# Patient Record
Sex: MALE | Race: WHITE | NOT HISPANIC OR LATINO | URBAN - METROPOLITAN AREA
[De-identification: names, ages, dates, MRNs, and addresses within clinical notes are randomized per-mention and may not be internally consistent; named-entity substitution may affect disease eponyms.]

---

## 2023-12-04 ENCOUNTER — EMERGENCY (EMERGENCY)
Facility: HOSPITAL | Age: 38
LOS: 1 days | Discharge: HOME | End: 2023-12-04
Attending: EMERGENCY MEDICINE
Payer: SELF-PAY

## 2023-12-04 VITALS
OXYGEN SATURATION: 98 % | TEMPERATURE: 98 F | RESPIRATION RATE: 16 BRPM | SYSTOLIC BLOOD PRESSURE: 128 MMHG | HEART RATE: 96 BPM | WEIGHT: 179.9 LBS | DIASTOLIC BLOOD PRESSURE: 89 MMHG | HEIGHT: 67 IN

## 2023-12-04 VITALS
RESPIRATION RATE: 20 BRPM | DIASTOLIC BLOOD PRESSURE: 84 MMHG | SYSTOLIC BLOOD PRESSURE: 143 MMHG | TEMPERATURE: 98 F | OXYGEN SATURATION: 97 % | HEART RATE: 97 BPM

## 2023-12-04 PROCEDURE — T1013: CPT

## 2023-12-04 PROCEDURE — 99285 EMERGENCY DEPT VISIT HI MDM: CPT

## 2023-12-04 PROCEDURE — 99283 EMERGENCY DEPT VISIT LOW MDM: CPT

## 2023-12-04 NOTE — ED PROVIDER NOTE - PATIENT PORTAL LINK FT
You can access the FollowMyHealth Patient Portal offered by [facility] by registering at the following website: [email or web address]. By joining [employer]’s FollowMyHealth portal, you will also be able to view your health information using other applications (apps) compatible with our system. You can access the FollowMyHealth Patient Portal offered by [facility] by registering at the following website: [email or web address]. By joining [employer]’s FollowMyHealth portal, you will also be able to view your health information using other applications (apps) compatible with our system.

## 2023-12-04 NOTE — ED ADULT TRIAGE NOTE - CHIEF COMPLAINT QUOTE
found sleeping next to store with bottle of liquor. alcohol on breath. as per ems, unable to get up from ground and unsteady on feet. as per pt, "I am depressed and an alcoholic and I might need some help."

## 2023-12-04 NOTE — ED ADULT NURSE NOTE - OBJECTIVE STATEMENT
PT sleeping on stretcher. PT arouses with touch and voice stimuli. RR even and unlabored on RA. skin is warm and dry. No signs of injury. PT admits to drinking today but is not answering any other assessment questions at this time. Dressed in yellow gown and bed in lowest position.

## 2023-12-04 NOTE — ED ADULT NURSE NOTE - NSFALLRISKINTERV_ED_ALL_ED
Yes Assistance OOB with selected safe patient handling equipment if applicable/Assistance with ambulation/Communicate fall risk and risk factors to all staff, patient, and family/Monitor gait and stability/Monitor for mental status changes and reorient to person, place, and time, as needed/Provide visual cue: yellow wristband, yellow gown, etc/Reinforce activity limits and safety measures with patient and family/Toileting schedule using arm’s reach rule for commode and bathroom/Use of alarms - bed, stretcher, chair and/or video monitoring/Call bell, personal items and telephone in reach/Instruct patient to call for assistance before getting out of bed/chair/stretcher/Non-slip footwear applied when patient is off stretcher/[place] to call system/Physically safe environment - no spills, clutter or unnecessary equipment/Purposeful Proactive Rounding/Room/bathroom lighting operational, light cord in reach Assistance OOB with selected safe patient handling equipment if applicable/Assistance with ambulation/Communicate fall risk and risk factors to all staff, patient, and family/Monitor gait and stability/Monitor for mental status changes and reorient to person, place, and time, as needed/Provide visual cue: yellow wristband, yellow gown, etc/Reinforce activity limits and safety measures with patient and family/Toileting schedule using arm’s reach rule for commode and bathroom/Use of alarms - bed, stretcher, chair and/or video monitoring/Call bell, personal items and telephone in reach/Instruct patient to call for assistance before getting out of bed/chair/stretcher/Non-slip footwear applied when patient is off stretcher/[place] to call system/Physically safe environment - no spills, clutter or unnecessary equipment/Purposeful Proactive Rounding/Room/bathroom lighting operational, light cord in reach

## 2023-12-04 NOTE — ED PROVIDER NOTE - CLINICAL SUMMARY MEDICAL DECISION MAKING FREE TEXT BOX
The patient presents with mild alcohol intoxication and now sober without the sign of alcohol withdrawal and will dc home

## 2023-12-04 NOTE — ED PROVIDER NOTE - CONSTITUTIONAL, MLM
Well appearing, awake, alert, oriented to person, place, time/situation and in no apparent distress. Mild intoxication normal...

## 2024-03-30 ENCOUNTER — EMERGENCY (EMERGENCY)
Facility: HOSPITAL | Age: 39
LOS: 1 days | Discharge: HOME | End: 2024-03-30
Attending: STUDENT IN AN ORGANIZED HEALTH CARE EDUCATION/TRAINING PROGRAM
Payer: SELF-PAY

## 2024-03-30 VITALS
OXYGEN SATURATION: 97 % | HEART RATE: 118 BPM | WEIGHT: 169.98 LBS | HEIGHT: 64 IN | DIASTOLIC BLOOD PRESSURE: 66 MMHG | TEMPERATURE: 98 F | RESPIRATION RATE: 16 BRPM | SYSTOLIC BLOOD PRESSURE: 114 MMHG

## 2024-03-30 PROCEDURE — 99285 EMERGENCY DEPT VISIT HI MDM: CPT

## 2024-03-30 PROCEDURE — 99283 EMERGENCY DEPT VISIT LOW MDM: CPT

## 2024-03-30 PROCEDURE — 82962 GLUCOSE BLOOD TEST: CPT

## 2024-03-30 NOTE — ED PROVIDER NOTE - PHYSICAL EXAMINATION
Gen: NAD, non-toxic, conversational  Eyes: PERRLA, EOMI  HENT: Normocephalic, atraumatic. External ears normal, no rhinorrhea, moist mucous membranes  CV: RRR, no M/R/G  Resp: CTAB, non-labored, speaking without difficulty on room air  Abd: soft, non tender, non rigid, no guarding or rebound tenderness  Back: No CVAT bilaterally, no midline ttp  Skin: dry, wwp  Neuro: AOx3, speech is fluent and appropriate  Psych: Mood ok, affect euthymic

## 2024-03-30 NOTE — ED PROVIDER NOTE - PATIENT PORTAL LINK FT
You can access the FollowMyHealth Patient Portal offered by [facility] by registering at the following website: [email or web address]. By joining [employer]’s FollowMyHealth portal, you will also be able to view your health information using other applications (apps) compatible with our system.

## 2024-03-30 NOTE — ED ADULT TRIAGE NOTE - CHIEF COMPLAINT QUOTE
pt [place] [employer] [place]. no external signs of trauma noted. alcohol on breath. admits to etoh ingestion today. awakens to painful stimuli and able to state name and follow commands.

## 2024-03-30 NOTE — ED PROVIDER NOTE - CLINICAL SUMMARY MEDICAL DECISION MAKING FREE TEXT BOX
38M presenting for evaluation after being found outside in the cold, appears intoxicated on alcohol, able to move extremities, no obvious signs of trauma, changed into yellow gown, placed in bed to sleep. Will reassess for sobriety often.

## 2024-03-30 NOTE — ED PROVIDER NOTE - NSFOLLOWUPINSTRUCTIONS_ED_ALL_ED_FT
Alcohol Abuse    Alcohol intoxication occurs when the amount of alcohol that a person has consumed impairs his or her ability to mentally and physically function. Chronic alcohol consumption can also lead to a variety of health issues including neurological disease, stomach disease, heart disease, liver disease, etc. Do not drive after drinking alcohol. Drinking enough alcohol to end up in an Emergency Room suggests you may have an alcohol abuse problem. Seek help at a drug addiction center.    SEEK IMMEDIATE MEDICAL CARE IF YOU HAVE ANY OF THE FOLLOWING SYMPTOMS: seizures, vomiting blood, blood in your stool, lightheadedness/dizziness, or becoming shaky to tremulous when you stop drinking.    L'abus d'alcool    L'intoxication alcoolique se produit lorsque la quantité d'alcool qu'une personne a consommée altère sa capacité à fonctionner mentalement et physiquement. La consommation chronique d'alcool peut également entraîner divers problèmes de santé, notamment yann maladies neurologiques, yann maladies de l'estomac, yann maladies cardiaques, yann maladies du foie, etc. Ne conduisez pas après avoir bu de l'alcool. Boire suffisamment d’alcool pour se retrouver aux urgences suggère que vous pourriez avoir un problème d’abus d’alcool. Demandez de l'aide dans un centre de toxicomanie.    RECHERCHEZ YANN SOINS MÉDICAUX IMMÉDIATS SI VOUS PRÉSENTEZ L'UN YANN SYMPTÔMES SUIVANTS : convulsions, vomissements de sang, sang dans daphney selles, étourdissements/étourdissements ou tremblements lorsque vous arrêtez de boire.

## 2024-03-30 NOTE — ED PROVIDER NOTE - OBJECTIVE STATEMENT
38-year-old male presented via EMS after being found outside intoxicated.  Denies having any concerns currently, wants to go to sleep.

## 2024-03-30 NOTE — ED PROVIDER NOTE - NSFOLLOWUPCLINICS_GEN_ALL_ED_FT
[id number] [place] [address]  Phone: [phone]  Fax:   Follow Up Time: 4-6 Days

## 2024-03-31 VITALS
SYSTOLIC BLOOD PRESSURE: 108 MMHG | OXYGEN SATURATION: 99 % | RESPIRATION RATE: 17 BRPM | HEART RATE: 98 BPM | TEMPERATURE: 98 F | DIASTOLIC BLOOD PRESSURE: 62 MMHG

## 2024-03-31 NOTE — ED ADULT NURSE NOTE - OBJECTIVE STATEMENT
pt presenting to ED for alcohol intoxication. pt resting comfortably in bed, in no acute distress, resp even and unlabored. equal chest rise and fall noted. . safety maintained

## 2024-04-03 DIAGNOSIS — F10.129 ALCOHOL ABUSE WITH INTOXICATION, UNSPECIFIED: ICD-10-CM

## 2024-04-13 ENCOUNTER — EMERGENCY (EMERGENCY)
Facility: HOSPITAL | Age: 39
LOS: 1 days | Discharge: HOME | End: 2024-04-13
Attending: EMERGENCY MEDICINE
Payer: SELF-PAY

## 2024-04-13 VITALS
OXYGEN SATURATION: 98 % | RESPIRATION RATE: 18 BRPM | DIASTOLIC BLOOD PRESSURE: 81 MMHG | TEMPERATURE: 97 F | WEIGHT: 179.9 LBS | HEART RATE: 87 BPM | SYSTOLIC BLOOD PRESSURE: 119 MMHG
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
## 2024-04-13 DIAGNOSIS — S00.11XA CONTUSION OF RIGHT EYELID AND PERIOCULAR AREA, INITIAL ENCOUNTER: ICD-10-CM

## 2024-04-13 DIAGNOSIS — F10.129 ALCOHOL ABUSE WITH INTOXICATION, UNSPECIFIED: ICD-10-CM

## 2024-04-13 PROCEDURE — 99284 EMERGENCY DEPT VISIT MOD MDM: CPT

## 2024-04-13 NOTE — ED ADULT TRIAGE NOTE - CHIEF COMPLAINT QUOTE
BIBEMS after being found sleeping outside of a bar. Pt arouses to touch stimuli. no signs of trauma. changed into yellow gown.

## 2024-04-13 NOTE — ED PROVIDER NOTE - OBJECTIVE STATEMENT
Pt is an approximately 41 yo M found intoxicated outside of a bar.  Pt arousable to tactile stimuli. denies complaints.

## 2024-04-13 NOTE — ED PROVIDER NOTE - PROGRESS NOTE DETAILS
George: Pt received in signout from Dr. Ruelas. Pt in no distress, seems more awake and alert. However on my exam pt with ecchymosis to right upper periorbital area. Pt says he fell yesterday. Will check CTs. Libby Miner, DO: Patient improved, CT results reviewed, patient states he feels ready to go, steady on feet.

## 2024-04-13 NOTE — ED PROVIDER NOTE - PHYSICAL EXAMINATION
Constitutional - well-developed.   Head - NCAT. Airway patent.   Eyes - PERRL.   CV - RRR. no murmur. no edema.   Pulm - CTAB.   Abd - soft, nt. no rebound. no guarding.   Neuro -moving all extremities.  arousable to tactile stimuli  Skin - No rash. .  MSK - normal ROM.

## 2024-04-13 NOTE — ED PROVIDER NOTE - PATIENT PORTAL LINK FT
You can access the FollowMyHealth Patient Portal offered by [facility] by registering at the following website: [email or web address]. By joining [employer]’s FollowMyHealth portal, you will also be able to view your health information using other applications (apps) compatible with our system.

## 2024-04-13 NOTE — ED PROVIDER NOTE - CLINICAL SUMMARY MEDICAL DECISION MAKING FREE TEXT BOX
Pt ambulated to and from the bathroom. still unsteady on his feet and intoxicated. Pt signed out to dr. gale pending sobriety.

## 2024-04-14 VITALS
DIASTOLIC BLOOD PRESSURE: 75 MMHG | TEMPERATURE: 99 F | HEART RATE: 103 BPM | OXYGEN SATURATION: 96 % | SYSTOLIC BLOOD PRESSURE: 112 MMHG | RESPIRATION RATE: 18 BRPM

## 2024-04-14 PROCEDURE — 70486 CT MAXILLOFACIAL W/O DYE: CPT | Mod: MC

## 2024-04-14 PROCEDURE — 70450 CT HEAD/BRAIN W/O DYE: CPT | Mod: 26,MC

## 2024-04-14 PROCEDURE — 99285 EMERGENCY DEPT VISIT HI MDM: CPT | Mod: 25

## 2024-04-14 PROCEDURE — 82962 GLUCOSE BLOOD TEST: CPT

## 2024-04-14 PROCEDURE — 70450 CT HEAD/BRAIN W/O DYE: CPT | Mod: MC

## 2024-04-14 PROCEDURE — 70486 CT MAXILLOFACIAL W/O DYE: CPT | Mod: 26,MC

## 2024-04-14 PROCEDURE — T1013: CPT

## 2024-04-14 NOTE — ED ADULT NURSE REASSESSMENT NOTE - NS ED NURSE REASSESS COMMENT FT1
Received report from M.T and assumed care of pt. Pt is AxOx2 (does not know the where he is), easily awakens to verbal stimuli. Upon assessment pt has a black R eye. Pt is awaiting CT
plan of care assumed from Ashok RN @1920. no S&S of acute distress, pt resting comfortably on stretcher. pt reports drinking alochol today, pt is slurring words. does not offer acute complains. denies pain/n/v. pt in yellow gown. awaiting reassessment by provider.
rounds frequently provided for pt comfort and safety. no acute distress noted. pt expresses no other needs at this time. no further concerns as of present. plan of care ongoing.
rounds frequently provided for pt comfort and safety. no acute distress noted. pt expresses no other needs at this time. no further concerns as of present. plan of care ongoing.

## 2024-04-20 ENCOUNTER — EMERGENCY (EMERGENCY)
Facility: HOSPITAL | Age: 39
LOS: 1 days | Discharge: HOME | End: 2024-04-20
Attending: EMERGENCY MEDICINE
Payer: SELF-PAY

## 2024-04-20 VITALS
OXYGEN SATURATION: 100 % | DIASTOLIC BLOOD PRESSURE: 75 MMHG | RESPIRATION RATE: 18 BRPM | HEART RATE: 95 BPM | TEMPERATURE: 98 F | SYSTOLIC BLOOD PRESSURE: 121 MMHG

## 2024-04-20 VITALS
DIASTOLIC BLOOD PRESSURE: 99 MMHG | TEMPERATURE: 98 F | OXYGEN SATURATION: 97 % | WEIGHT: 179.9 LBS | RESPIRATION RATE: 20 BRPM | HEART RATE: 80 BPM | HEIGHT: 65 IN | SYSTOLIC BLOOD PRESSURE: 133 MMHG

## 2024-04-20 PROCEDURE — 70450 CT HEAD/BRAIN W/O DYE: CPT | Mod: MC

## 2024-04-20 PROCEDURE — 70450 CT HEAD/BRAIN W/O DYE: CPT | Mod: 26,MC

## 2024-04-20 PROCEDURE — 99285 EMERGENCY DEPT VISIT HI MDM: CPT | Mod: 25

## 2024-04-20 PROCEDURE — 72125 CT NECK SPINE W/O DYE: CPT | Mod: MC

## 2024-04-20 PROCEDURE — 72125 CT NECK SPINE W/O DYE: CPT | Mod: 26,MC

## 2024-04-20 PROCEDURE — 99284 EMERGENCY DEPT VISIT MOD MDM: CPT

## 2024-04-20 PROCEDURE — 82962 GLUCOSE BLOOD TEST: CPT

## 2024-04-20 NOTE — ED PROVIDER NOTE - PROGRESS NOTE DETAILS
Patient continues to be somnolent, not stable for discharge at this time Clinically sober ambulating no acute distress advised on alcohol cessation return to ED if worse

## 2024-04-20 NOTE — ED PROVIDER NOTE - ATTENDING CONTRIBUTION TO CARE
39-year-old  male with past medical history of alcohol use disorder presents intoxicated with right periorbital ecchymosis.  Patient brought straight to CT on arrival.  No acute traumatic injuries found on CT of head.  Patient signed out pending sobriety.

## 2024-04-20 NOTE — ED PROVIDER NOTE - PHYSICAL EXAMINATION
General: well appearing though slightly intoxicated appearing, NAD  Head: NC, right evolving periorbital ecchymosis  EENT: EOMI, no scleral icterus  Cardiac: RRR, no apparent murmurs, no lower extremity edema  Respiratory: CTABL, no respiratory distress   Abdomen: soft, ND, NT, nonperitonitic  MSK/Vascular: full ROM, soft compartments, warm extremities  Neuro: AAOx3, sensation to light touch intact  Psych: calm, cooperative

## 2024-04-20 NOTE — ED PROVIDER NOTE - PATIENT PORTAL LINK FT
You can access the FollowMyHealth Patient Portal offered by [facility] by registering at the following website: [email or web address]. By joining [employer]’s FollowMyHealth portal, you will also be able to view your health information using other applications (apps) compatible with our system.

## 2024-04-20 NOTE — ED ADULT NURSE REASSESSMENT NOTE - NS ED NURSE REASSESS COMMENT FT1
Report received from off-going RN at 19:30, VSS, pt resting comfortably in stretcher. Respirations even and unlabored. Patient safety maintained. Pt placed on cardiac monitor and . Awaiting HR bed. Report received from off-going RN at 19:30, VSS, pt resting comfortably in stretcher. Respirations even and unlabored. Patient safety maintained.

## 2024-04-20 NOTE — ED ADULT NURSE NOTE - NSFALLRISKINTERV_ED_ALL_ED

## 2024-04-20 NOTE — ED ADULT NURSE NOTE - OBJECTIVE STATEMENT
patient presents to ED intoxicated, with bruising to right orbital. patient endorses multiple falls in the past few days but offers no other medical complaints. Denies pain, cp, sob, n/v, abdominal pain. "I just want to sleep"  patient presents alert but intoxicated, calm and cooperative.

## 2024-04-20 NOTE — ED PROVIDER NOTE - CLINICAL SUMMARY MEDICAL DECISION MAKING FREE TEXT BOX
39-year-old male past medical history including EtOH abuse presenting for intoxication.  Pt returned to baseline. Ambulating with normal gait. Jb RAPP.

## 2024-04-20 NOTE — ED PROVIDER NOTE - NSFOLLOWUPINSTRUCTIONS_ED_ALL_ED_FT
Intoxicación alcohólica  Alcohol Intoxication  La intoxicación alcohólica ocurre cuando gissell persona ya no piensa con claridad ni se desempeña antonio después de consumir bebidas alcohólicas. Overly también se denomina deterioro. La intoxicación alcohólica puede ocurrir tan solo con gissell copa. El definición legal de la intoxicación alcohólica depende de la cantidad de alcohol en la pavel (concentración de alcohol en la pavel, LUCILA). Gissell LUCILA de 80 a 100 mg/dl o mayor se considera legalmente zo gissell intoxicación alcohólica. El nivel de deterioro depende de lo siguiente:  La cantidad de alcohol que la persona bebió.  La edad, el sexo y el peso de la persona.  La frecuencia con la que la persona julio cesar.  Si la persona tiene otras enfermedades, tales zo diabetes, convulsiones o gissell afección cardíaca.  La intoxicación alcohólica puede variar de leve a grave. La afección puede ser peligrosa, especialmente si la persona:  También usa ciertas drogas ilegales y medicamentos recetados.  Julio Cesar gissell gran cantidad de alcohol en un periodo corto de tiempo (consume alcohol compulsivamente).  En las mujeres, el consumo de alcohol compulsivo significa beber cuatro o más medidas de alcohol a la vez.  En los hombres, el consumo de alcohol compulsivo significa beber alex o más medidas de alcohol a la vez.  Si usted o alguien a fonseca alrededor parece estar intoxicado, diga algo y actúe.    ¿Cuáles son las causas?  La causa de esta afección es el consumo de alcohol.    ¿Qué incrementa el riesgo?  Los siguientes factores pueden hacer que sea más propenso a contraer esta afección:  Presión de los pares en los adultos jóvenes.  Dificultad para manejar el estrés.  Antecedentes de consumo excesivo de drogas o alcohol.  Antecedentes familiares de consumo excesivo de alcohol o drogas.  Combinación de alcohol con drogas.  Peso corporal bajo.  Consumo de alcohol compulsivo.  ¿Cuáles son los signos o síntomas?  Los síntomas de la intoxicación alcohólica pueden variar de gissell persona a otra. Los síntomas pueden ser leves, moderados o graves.    Los síntomas de gissell intoxicación alcohólica leve pueden incluir los siguientes:  Sensación de relajación o somnolencia.  Tener gissell leve dificultad con la coordinación, el habla, la memoria o la atención.  Los síntomas de gissell intoxicación alcohólica moderada pueden incluir los siguientes:  Diony janine o tristeza intensa.  Tener dificultad con la coordinación, el habla, la memoria o la atención.  Los síntomas de gissell intoxicación alcohólica grave pueden incluir los siguientes:  Tener gissell seria dificultad con la coordinación, el habla, la memoria o la atención.  Desmayo.  Vómitos.  Confusión.  Respiración lenta.  Coma.  La intoxicación alcohólica puede cambiar rápidamente de leve a grave. Puede causar coma o la muerte, especialmente en las personas que no beben alcohol con frecuencia.    ¿Cómo se diagnostica?  El médico le preguntará la cantidad y el tipo de bebida alcohólica que bebió. La intoxicación también puede diagnosticarse en función de:  Los síntomas y los antecedentes médicos.  Un examen físico.  Un análisis de pavel que mide la LUCILA.  El olor a alcohol en el aliento.  ¿Cómo se trata?  El tratamiento para la intoxicación por alcohol puede incluir:  Ser controlado en un departamento de emergencias, hospital o centro de tratamiento hasta que la LUCILA disminuya y sea seguro para usted regresar a fonseca casa.  Líquidos intravenosos (i.v.) para prevenir o tratar la pérdida de líquido en el cuerpo (deshidratación).  Medicamentos para tratar las náuseas o los vómitos, o para eliminar el alcohol del cuerpo.  Asesoramiento sobre los peligros de consumir alcohol.  Tratamiento para el trastorno por el consumo de sustancias.  Oxigenoterapia o gissell máquina para respirar (respirador).  Beber alcohol fam mucho tiempo puede tener efectos a london plazo en el cerebro, el corazón y el aparato digestivo. Estos efectos pueden ser graves y pueden requerir tratamiento.    Siga estas instrucciones en fonseca casa:  A sign showing that a person should not drive.  Comida y bebida    A 12-ounce bottle of beer, a 5-ounce glass of wine, and a 1.5-ounce shot of hard liquor.  No zoey alcohol si:  Fonseca médico le indica no hacerlo.  Está embarazada, puede estar embarazada o está tratando de quedar embarazada.  No tiene la edad legal para beber, o es sadie de 21 años de edad en los [place]  Está tomando medicamentos que no se deben leyla con alcohol.  Tiene gissell afección médica y el alcohol la empeora.  Tiene que conducir o realizar actividades que requieren que esté alerta.  Tiene un trastorno por abuso de sustancias.  Pregúntele al médico si el alcohol es seguro para usted. Si el médico le permite beber alcohol, limite la cantidad que adriana a lo siguiente:  De 0 a 1 medida por día para las mujeres que no están embarazadas.  De 0 a 2 medidas por día para los hombres.  Sepa cuánta cantidad de alcohol hay en las bebidas que adriana. En los [place], gissell medida equivale a gissell botella de cerveza de 12 oz (355 ml), un vaso de vino de 5 oz (148 ml) o un vaso de gissell bebida alcohólica de dilip graduación de 1½ oz (44 ml).  Evite beber alcohol con el estómago vacío.  El alcohol aumenta la micción. Es importante mantenerse hidratado y evitar la cafeína.  Evite consumir más de gissell bebida alcohólica por hora.  Cuando zoey más que gissell medida de alcohol, zoey agua o gissell bebida sin alcohol entre las bebidas alcohólicas.  Instrucciones generales    Use los medicamentos de venta david y los recetados solamente zo se lo haya indicado el médico.  No conduzca después de beber cualquier cantidad de alcohol. Organice un conductor designado u otra forma de volver a casa.  Pídale a alguna persona responsable que se quede con usted mientras está embriagado. Nodebería quedarse solo.  Comuníquese con un médico si:  No mejora luego de algunos días.  Tiene problemas en el trabajo, la escuela o el hogar debido al consumo de alcohol.  Solicite ayuda de inmediato si:  Tiene alguno de los siguientes síntomas:  Dificultad para mantenerse despierto.  Grave dificultad con la coordinación, el habla, la memoria o la atención.  Le troncoso dicho que pudo bree tenido gissell convulsión.  Vómitos con pavel de color villanueva brillante o gissell sustancia parecida a la borra del café.  Tiene pavel en la materia fecal (heces). La pavel puede hacer que las heces tengan color villanueva brillante, color shine o aspecto alquitranado.  Estos síntomas pueden indicar gissell emergencia. Solicite ayuda de inmediato. Llame al 911.  No espere a mehran si los síntomas desaparecen.  No conduzca por bora propios medios hasta el hospital.  También solicite ayuda de inmediato si:  Tiene pensamientos acerca de lastimarse o lastimar a otras personas.  Siga alguno de estos pasos si siente que puede lastimarse o lastimar a otras personas, o tiene pensamientos de poner fin a fonseca edilson:  Llame al 911.  Llame a National Suicide Prevention Lifeline (Línea [employer] Nacional para la Prevención del Suicidio) al 1-559.669.3693 o al 988. Está disponible las 24 horas del día.  Envíe un mensaje de texto a la línea para casos de crisis al 601235.  Resumen  La intoxicación alcohólica ocurre cuando gissell persona ya no piensa con claridad ni se desempeña antonio después de consumir bebidas alcohólicas.  Pregúntele al médico si el alcohol es seguro para usted. Si el médico le permite beber alcohol, limite la cantidad que adriana.  Comuníquese con el médico si el consumo de alcohol le ha causado problemas en el trabajo, en la escuela o en fonseca casa.  Busque ayuda de inmediato si tiene pensamientos acerca de lastimarse a usted mismo o a otras personas.  Esta información no tiene zo fin reemplazar el consejo del médico. Asegúrese de hacerle al médico cualquier pregunta que tenga.

## 2024-04-20 NOTE — ED PROVIDER NOTE - OBJECTIVE STATEMENT
39-year-old male past medical history including EtOH abuse presenting for intoxication.  Patient found to have a right periorbital ecchymosis.  When asked when he fell, patient says "today, yesterday, every day".  Patient not cooperating with history.  Due to intoxication and potential head trauma, brought to CT scan for priority CT head and neck.

## 2024-04-20 NOTE — ED ADULT NURSE REASSESSMENT NOTE - NS ED NURSE REASSESS COMMENT FT1
patient more alert and reoriented to situation, breathing even and unlabored. patient offers no medical complaints but reports "feeling sleepy."   patient in pleasant mood, grateful, calm and cooperative.

## 2024-04-20 NOTE — ED ADULT TRIAGE NOTE - CHIEF COMPLAINT QUOTE
pt found in the streets intoxicated, pt states he fell and hit his head, pt admits to drinking today. GSC 15 AOX4

## 2024-04-27 DIAGNOSIS — F10.129 ALCOHOL ABUSE WITH INTOXICATION, UNSPECIFIED: ICD-10-CM
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
## 2024-04-27 DIAGNOSIS — S05.11XA CONTUSION OF EYEBALL AND ORBITAL TISSUES, RIGHT EYE, INITIAL ENCOUNTER: ICD-10-CM
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
## 2024-04-30 ENCOUNTER — EMERGENCY (EMERGENCY)
Facility: HOSPITAL | Age: 39
LOS: 1 days | Discharge: HOME | End: 2024-04-30
Attending: EMERGENCY MEDICINE
Payer: SELF-PAY

## 2024-04-30 VITALS
TEMPERATURE: 98 F | HEART RATE: 96 BPM | RESPIRATION RATE: 16 BRPM | OXYGEN SATURATION: 95 % | DIASTOLIC BLOOD PRESSURE: 90 MMHG | SYSTOLIC BLOOD PRESSURE: 137 MMHG | HEIGHT: 64 IN

## 2024-04-30 PROCEDURE — 99283 EMERGENCY DEPT VISIT LOW MDM: CPT

## 2024-04-30 PROCEDURE — 99285 EMERGENCY DEPT VISIT HI MDM: CPT

## 2024-04-30 NOTE — ED PROVIDER NOTE - PHYSICAL EXAMINATION
· CONSTITUTIONAL: In no apparent distress.  · HEENMT: Airway patent, normal appearing mouth, nose, throat, neck supple with full range of motion, no cervical adenopathy.  · EYES: Pupils equal, round and reactive to light, Extra-ocular movement intact, eyes are clear b/l  · CARDIAC: Regular rate and rhythm, Heart sounds S1 S2 present, no murmurs, rubs or gallops  · RESPIRATORY: No respiratory distress. No stridor, Lungs sounds clear with good aeration bilaterally.  · GASTROINTESTINAL: Abdomen soft, nontender, non-distended, no rebound, no guarding and no masses. no hepatosplenomegaly.  · MUSCULOSKELETAL: Spine appears normal, movement of extremities grossly intact.  · NEUROLOGICAL: lerthargic and interactive, no focal deficits  · SKIN: No cyanosis, no pallor, no jaundice, no rash  · PSYCHIATRIC: Normal mood and affect, no apparent risk to self or others

## 2024-04-30 NOTE — ED ADULT NURSE NOTE - LANGUAGE ASSISTANCE NEEDED
Sunscreen Recommendations: Discussed sunblock should be applied to exposed areas daily, and be reapplied every two hours while exposed . The sunblock should be broad spectrum SPF-50, UVA/UVB and contain Zinc and Titanium Dioxide(Blue Lizard & Sun Bum are some good OTC brands). Strongly recommend Elta MD products. Recommend sun protective clothing such as long sleeve UPF protective shirts, wide brim hats, neck covers and sunglasses as it has been proven to prevent further photo damage from the sun. Detail Level: Zone Detail Level: Detailed Yes-Patient/Caregiver accepts free interpretation services...

## 2024-04-30 NOTE — ED PROVIDER NOTE - PATIENT PORTAL LINK FT
You can access the FollowMyHealth Patient Portal offered by [facility] by registering at the following website: [email or web address]. By joining [employer]’s FollowMyHealth portal, you will also be able to view your health information using other applications (apps) compatible with our system.

## 2024-04-30 NOTE — ED PROVIDER NOTE - OBJECTIVE STATEMENT
40 y/o M  presented via EMS after being found outside of [employer] intoxicated. States drank "a lot", would not elaborate further. With no other complaints currently.

## 2024-04-30 NOTE — ED PROVIDER NOTE - CLINICAL SUMMARY MEDICAL DECISION MAKING FREE TEXT BOX
40 y/o M present with acute alcohol intoxication. With no other complaints, Physical exam unremarkable.

## 2024-04-30 NOTE — ED ADULT NURSE NOTE - OBJECTIVE STATEMENT
40 y/o M  presented via EMS after being found outside of [employer] intoxicated. States drank "a lot", would not elaborate further. With no other complaints currently. Seen and evaluated by provider, no further orders at this time.  Patient resting comfortably on stretcher, yellow gown in place and all belongings removed and secured.  Offers no complaints at this time.

## 2024-04-30 NOTE — ED PROVIDER NOTE - ATTENDING CONTRIBUTION TO CARE
I performed a face to face bedside interview with patient regarding history of present illness, review of symptoms and past medical history. I completed an independent physical exam.  I have discussed patient's plan of care with resident.   I agree with note as stated above including HISTORY OF PRESENT ILLNESS, HIV, PAST MEDICAL/SURGICAL/FAMILY/SOCIAL HISTORY, ALLERGIES AND HOME MEDICATIONS, REVIEW OF SYSTEMS, PHYSICAL EXAM, MEDICAL DECISION MAKING and any PROGRESS NOTES during the time I functioned as the attending physician for this patient unless otherwise noted. My brief assessment is as follows:    General in no acute distress unkept respiratory clear cardiac no murmur abdomen soft neuro intact ambulating no acute distress no SI no HI advised on alcohol cessation refusing alcohol rehab clinically sober ambulating no acute distress return to ED if worse

## 2024-04-30 NOTE — ED PROVIDER NOTE - NSFOLLOWUPINSTRUCTIONS_ED_ALL_ED_FT
Paciente: MARJ REDD  Profesional que asiste al paciente: Ravi Wang  Consumo excesivo de alcohol y nutrición  Alcohol Abuse and Nutrition    El consumo excesivo de alcohol es cualquier patrón de consumo de alcohol que perjudique la kemi, las relaciones o el trabajo. El consumo excesivo de alcohol puede causar nutrición deficiente (malnutrición o desnutrición) y gissell falta de nutrientes (carencias nutritivas), lo que pepper vez ocasione otras complicaciones. El consumo excesivo de alcohol causa desnutrición y carencia nutritivas de dos formas:    Hace que el hígado funcione de forma anormal. Howe afecta la forma en la que el organismo divide (descompone) y absorbe los nutrientes de los alimentos.  Hace que la persona coma mal. Muchas personas que consumen alcohol en exceso no ingieren la cantidad suficiente de carbohidratos, proteínas, grasas, vitaminas y minerales.    Los nutrientes que suelen faltar (ser deficitarios) en las personas que consumen alcohol en exceso incluyen:    Vitaminas.    Vitamina A. Esta es importante para la visión, el metabolismo y la capacidad para combatir las infecciones (inmunidad).  Vitaminas B. Estas incluyen vitaminas tales zo el folato, la tiamina y la niacina. Estas son necesarias para el crecimiento de las nuevas células.  Vitamina C. Esta desempeña un papel importante en la cicatrización de las heridas, la inmunidad y ayuda al organismo a absorber el hafsa.  Vitamina D. Esta es necesaria para que el organismo absorba y use el calcio. Es producida por el hígado, lizzy puede obtenerse también de alimentos y de la exposición al sol.  Minerales.    Calcio. Nati es necesario para tener huesos saludables, así zo gissell función del corazón y los vasos sanguíneos (cardiovascular) saludable.  Hafsa. Es importante para la pavel, los músculos y el funcionamiento del sistema nervioso.  Magnesio. Desempeña un papel importante en el funcionamiento muscular y nervioso, y ayuda a controlar el nivel de azúcar en la pavel y la presión arterial.  Zinc. Nati es importante para el normal funcionamiento del sistema nervioso y el aparato digestivo (tubo digestivo).    Si mark que tiene un problema de dependencia del alcohol, o si le gavin dejar de beber porque se siente enfermo o diferente cuando no julio cesar alcohol, hable con fonseca médico u otro profesional de la kemi acerca de dónde obtener ayuda.    La nutrición es un factor fundamental del tratamiento para el consumo excesivo de alcohol. El médico o el especialista en dieta y nutrición (nutricionista) trabajarán con usted para diseñar un plan que pueda ayudar a que el organismo recupere los nutrientes y a evitar el riesgo de posibles complicaciones.    ¿En qué consiste el plan?     El nutricionista puede desarrollar un plan de alimentación específico en función de fonseca estado y de otros problemas que pueda tener. Un plan de alimentación incluirá por lo general:    Gissell dieta equilibrada.    Cereales: de 6 a 8 onzas (170 a 227 g) por día. Algunos ejemplos de 1 onza de cereales integrales incluyen 1 taza de cereales de gabby integral, ½ taza de arroz integral o 1 rodaja de pan de gabby integral.  Verduras: de 2 a 3 tazas por día. Algunos ejemplos de 1 taza de verduras incluyen 2 zanahorias medianas, 1 tomate asuncion o 2 tallos de apio.  Frutas: de 1 a 2 tazas por día. Algunos ejemplos de 1 taza de frutas incluyen 1 banana asuncion, 1 manzana pequeña, 8 fresas grandes o 1 naranja asuncion.  Carne y otras proteínas: de 5 a 6 onzas (142 a 170 g) por día.    Gissell porción de carne o pescado del tamaño de un von de cartas pesa alrededor de 3 a 4 onzas.  Los alimentos que proporcionan 1 onza de proteínas incluyen 1 huevo, ½ taza de reece secos o semillas, o 1 cucharada (16 g) de mantequilla de maní.  Lácteos: de 2 a 3 tazas por día. Algunos ejemplos de 1 taza de lácteos son 8 onzas (230 ml) de leche, 8 onzas (230 g) de yogur o 1½ onzas (44 g) de queso natural.  Suplementos de vitaminas y minerales.    ¿Cuáles son algunos consejos para seguir nati plan?  Ingiera comidas y refrigerios con frecuencia. Intente hacer 5 o 6 comidas pequeñas por día.  [place] los suplementos vitamínicos y minerales zo se lo haya indicado el nutricionista.  Si está desnutrido o si se lo recomienda el nutricionista:    Puede seguir gissell dieta dilip en proteínas y calorías. Puede incluir:    2000 a 3000 calorías (kilocalorías) al día.  70 a 100 g (gramos) de proteínas al día.  Se le puede indicar que siga gissell dieta que incluya gissell bebida nutricional completa. Nati tipo de suplemento nutricional puede ayudar a que el organismo recupere las calorías, las proteínas y las vitaminas. En función de fonseca estado, pepper vez le recomienden que consuma esta bebida en lugar de comer o que la agregue a las comidas.  Ciertos medicamentos pueden causar cambios en el apetito, el sentido del gusto y el peso. Trabaje con el médico y el nutricionista para hacer cambios en bora medicamentos y en el plan de alimentación.  Si no puede ingerir la cantidad suficiente de comida y calorías por boca, el médico puede recomendar que le coloquen gissell sonda de alimentación. Esta sonda proporciona suplementos nutricionales directamente al estómago.    Alimentos recomendados  Coma alimentos que oumar ricos en moléculas que eviten que el oxígeno reaccione con la comida (antioxidantes). Estos alimentos incluyen uvas, reece rojos, reece secos, té migdalia, y verduras migdalia oscuro o anaranjadas. Howe puede ayudar a prevenir parte de la agresión que sufre el hígado cuando se consume alcohol.  Coma diariamente gissell variedad de frutas y verduras frescas. Howe ayudará a aportar fibras y vitaminas a fonseca dieta.  Ivonne gissell gran cantidad de agua y otros líquidos transparentes, zo jugo de manzana y caldo. Intente beber por lo menos de 48 a 64 oz (de 1.5 a 2 l) de agua por día.  Incluya en fonseca dieta alimentos fortificados con vitaminas y minerales. Los alimentos que suelen estar fortificados son, entre otros, la leche, el jugo de naranja, los cereales y el pan.  Coma alimentos variados con alto contenido de ácidos grasos omega 3 y omega 6. Estos incluyen, pescado, reece secos y semillas, y porotos de soja. Estos alimentos pueden ayudar a que el hígado se recupere y, además, a estabilizar el estado de ánimo.  Si es vegetariano:    Coma alimentos variados ricos en proteínas.  A la hora de las comidas y las colaciones, combine cereales integrales con proteínas vegetales. Por ejemplo, coma arroz con frijoles, unte gissell tostada integral con mantequilla de maní o ingiera douglas con semillas de girasol.    Es posible que los productos que se enumeran más arriba no constituyan gissell lista completa de los alimentos y las bebidas que puede leyla. Consulte a un nutricionista para obtener más información.    Alimentos que deben evitarse  Evite los alimentos y las bebidas con alto contenido de grasa y azúcar. Las bebidas con mucha azúcar, los refrigerios salados y los dulces contienen calorías vacías. Howe significa que carecen de nutrientes importantes, zo las proteínas, las fibras y las vitaminas.  Evite leyla alcohol. Esta es la mejor forma de evitar la desnutrición debido al consumo excesivo de alcohol. Si debe beber, ivonne cantidades moderadas. Beber en forma moderada significa limitarse a no más de 1 medida por día si es stephanie y no está embarazada, y de 2 medidas por día si es hombre. Gissell medida equivale a 12 oz (355 ml) de cerveza, 5 oz (148 ml) de vino o 1½ oz (44 ml) de bebidas alcohólicas de dilip graduación.  Limite el consumo de cafeína. Reemplace las bebidas zo el café y té shine con café descafeinado y té de hierbas descafeinado.    Es posible que los productos que se enumeran más arriba no constituyan gissell lista completa de los alimentos y las bebidas que debe evitar. Consulte a un nutricionista para obtener más información.    Resumen  El consumo excesivo de alcohol puede causar nutrición deficiente (malnutrición o desnutrición) y gissell falta de nutrientes (carencias nutritivas), lo que pepper vez ocasione otros problemas de kemi.  Las carencias nutritivas frecuentes incluyen las carencias de vitaminas (A, B, C y D) y de minerales (calcio, hafsa, magnesio y zinc).  La nutrición es un factor fundamental del tratamiento para el consumo excesivo de alcohol.  EL médico y el nutricionista pueden ayudarlo a desarrollar un plan de alimentación específico que incluya gissell dieta equilibrada más suplementos vitamínicos y minerales.    NOTAS ADICIONALES E INSTRUCCIONES    Please follow up with your Primary MD in 24-48 hr.  Seek immediate medical care for any new/worsening signs or symptoms.     Document Released: 10/12/2006 Document Revised: 4/7/2020 Document Reviewed: 9/4/2018  Elsevier Interactive Patient Education ©2019 [employer] This information is not intended to replace advice given to you by your health care provider. Make sure you discuss any questions you have with your health care provider.

## 2024-04-30 NOTE — ED ADULT NURSE NOTE - NSFALLUNIVINTERV_ED_ALL_ED
Bed/Stretcher in lowest position, wheels locked, appropriate side rails in place/Call bell, personal items and telephone in reach/Instruct patient to call for assistance before getting out of bed/chair/stretcher/Non-slip footwear applied when patient is off stretcher/[place] to call system/Physically safe environment - no spills, clutter or unnecessary equipment/Purposeful proactive rounding/Room/bathroom lighting operational, light cord in reach

## 2024-04-30 NOTE — ED ADULT TRIAGE NOTE - CHIEF COMPLAINT QUOTE
BIBEMS from [facility]. bystander called 911. admits to etoh. denies drug use. no signs of trauma noted. follows commands. changed into yellow gown, belongings secured.

## 2024-05-01 VITALS
DIASTOLIC BLOOD PRESSURE: 78 MMHG | SYSTOLIC BLOOD PRESSURE: 117 MMHG | OXYGEN SATURATION: 95 % | TEMPERATURE: 98 F | HEART RATE: 92 BPM | RESPIRATION RATE: 17 BRPM

## 2024-05-01 PROBLEM — Z78.9 OTHER SPECIFIED HEALTH STATUS: Chronic | Status: ACTIVE | Noted: 2024-03-30

## 2024-11-05 ENCOUNTER — EMERGENCY (EMERGENCY)
Facility: HOSPITAL | Age: 39
LOS: 1 days | Discharge: HOME | End: 2024-11-05
Attending: EMERGENCY MEDICINE
Payer: SELF-PAY

## 2024-11-05 VITALS
RESPIRATION RATE: 18 BRPM | DIASTOLIC BLOOD PRESSURE: 79 MMHG | OXYGEN SATURATION: 93 % | SYSTOLIC BLOOD PRESSURE: 126 MMHG | HEART RATE: 95 BPM | TEMPERATURE: 98 F

## 2024-11-05 PROCEDURE — 99283 EMERGENCY DEPT VISIT LOW MDM: CPT

## 2024-11-05 PROCEDURE — 82962 GLUCOSE BLOOD TEST: CPT

## 2024-11-05 PROCEDURE — 99285 EMERGENCY DEPT VISIT HI MDM: CPT

## 2024-11-05 NOTE — ED PROVIDER NOTE - OBJECTIVE STATEMENT
43M unknown [facility] BIBEMS after being found sleeping next to a bottle of vodka. Patient currently sleeping. When woken up he mumbles and goes back to sleep. Incoherent and not answering questions.

## 2024-11-05 NOTE — ED PROVIDER NOTE - PROGRESS NOTE DETAILS
Carroll GUTIERREZ: Patient reassesed--no complaints.  Vital signs normal.  Resting comfortably.  A&Ox3.  Speech clear. Gait normal.  Clinically sober. Admits to drinking a lot of vodka earlier today. Denies drug use. Return precautions given.

## 2024-11-05 NOTE — ED ADULT TRIAGE NOTE - CHIEF COMPLAINT QUOTE
BIBA for altered mental status. As per EMS, pt found passed out next to liter of vodka. Responds to verbal stimuli. BIBA for altered mental status. As per EMS, pt found passed out next to liter of vodka.

## 2024-11-05 NOTE — ED PROVIDER NOTE - PHYSICAL EXAMINATION
Gen: Well appearing in NAD  Head: NC/AT  Neck: trachea midline  Resp:  No distress  Ext: no deformities  Neuro:  Appears non focal. Somnolent but arousable.  Skin:  Warm and dry as visualized

## 2024-11-05 NOTE — ED PROVIDER NOTE - PATIENT PORTAL LINK FT
You can access the FollowMyHealth Patient Portal offered by [facility] by registering at the following website: [email or web address]. By joining [facility]’s FollowMyHealth portal, you will also be able to view your health information using other applications (apps) compatible with our system.

## 2024-11-06 VITALS
RESPIRATION RATE: 17 BRPM | HEART RATE: 94 BPM | TEMPERATURE: 97 F | OXYGEN SATURATION: 100 % | SYSTOLIC BLOOD PRESSURE: 134 MMHG | DIASTOLIC BLOOD PRESSURE: 84 MMHG

## 2024-11-06 NOTE — ED ADULT NURSE NOTE - HIV OFFER
CM confirmed [facility] still accepting pt and is going to room 116. OK for nurse to call report to [phone].     Andrae Marie RN, [facility]   [phone] Opt out

## 2024-11-06 NOTE — ED ADULT NURSE REASSESSMENT NOTE - NS ED NURSE REASSESS COMMENT FT1
pt a&ox4, in NAD, resps even and unlabored. ambulatory with steady gait. denies any pain/discomfort. pt given PO fluids and tolerating PO intake. safety maintained

## 2024-11-06 NOTE — ED ADULT NURSE NOTE - NSFALLHARMRISKINTERV_ED_ALL_ED
Assistance OOB with selected safe patient handling equipment if applicable/Assistance with ambulation/Communicate risk of Fall with Harm to all staff, patient, and family/Monitor gait and stability/Monitor for mental status changes and reorient to person, place, and time, as needed/Provide visual cue: red socks, yellow wristband, yellow gown, etc/Reinforce activity limits and safety measures with patient and family/Toileting schedule using arm’s reach rule for commode and bathroom/Use of alarms - bed, stretcher, chair and/or video monitoring/Bed in lowest position, wheels locked, appropriate side rails in place/Call bell, personal items and telephone in reach/Instruct patient to call for assistance before getting out of bed/chair/stretcher/Non-slip footwear applied when patient is off stretcher/[place] to call system/Physically safe environment - no spills, clutter or unnecessary equipment/Purposeful Proactive Rounding/Room/bathroom lighting operational, light cord in reach

## 2024-11-06 NOTE — ED ADULT NURSE NOTE - OBJECTIVE STATEMENT
pt brought in by ems, found sleeping outside next to bottle of vodka. pt resting comfortably in stretcher on pulse ox, spO2 wnl. pt responsive to voice and stimuli however falls back to sleep. ETOH smell on breath. no obvious deformities noted, pt remains in yellow gown with belongings secured. safety maintained with bed locked in lowest position.

## 2024-11-09 DIAGNOSIS — F10.129 ALCOHOL ABUSE WITH INTOXICATION, UNSPECIFIED: ICD-10-CM
